# Patient Record
Sex: MALE | URBAN - METROPOLITAN AREA
[De-identification: names, ages, dates, MRNs, and addresses within clinical notes are randomized per-mention and may not be internally consistent; named-entity substitution may affect disease eponyms.]

---

## 2020-01-08 ENCOUNTER — EMERGENCY (EMERGENCY)
Facility: HOSPITAL | Age: 40
LOS: 0 days | Discharge: HOME | End: 2020-01-08
Attending: EMERGENCY MEDICINE
Payer: MEDICAID

## 2020-01-08 VITALS
SYSTOLIC BLOOD PRESSURE: 118 MMHG | TEMPERATURE: 98 F | OXYGEN SATURATION: 100 % | DIASTOLIC BLOOD PRESSURE: 79 MMHG | RESPIRATION RATE: 18 BRPM

## 2020-01-08 VITALS — WEIGHT: 199.96 LBS | HEIGHT: 68.9 IN
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
## 2020-01-08 DIAGNOSIS — S68.110A COMPLETE TRAUMATIC METACARPOPHALANGEAL AMPUTATION OF RIGHT INDEX FINGER, INITIAL ENCOUNTER: ICD-10-CM
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
## 2020-01-08 PROCEDURE — 99283 EMERGENCY DEPT VISIT LOW MDM: CPT | Mod: 25

## 2020-01-08 PROCEDURE — 73130 X-RAY EXAM OF HAND: CPT | Mod: RT

## 2020-01-08 PROCEDURE — 99283 EMERGENCY DEPT VISIT LOW MDM: CPT

## 2020-01-08 PROCEDURE — 73130 X-RAY EXAM OF HAND: CPT | Mod: 26,RT

## 2020-01-08 RX ORDER — OXYCODONE AND ACETAMINOPHEN 5; 325 MG/1; MG/1
2 TABLET ORAL ONCE
Refills: 0 | Status: DISCONTINUED | OUTPATIENT
Start: 2020-01-08 | End: 2020-01-08

## 2020-01-08 RX ADMIN — OXYCODONE AND ACETAMINOPHEN 2 TABLET(S): 5; 325 TABLET ORAL at 12:02

## 2020-01-08 NOTE — ED ADULT TRIAGE NOTE - CHIEF COMPLAINT QUOTE
pt reports amputation to the R. 1st finger at the 2nd joint yesterday s/p work injury, surgery done at [facility]. c/o increased pain to R. hand, took 1 perocet today and 2 yesterday w/o relief of pain. surgical dressing applied at this time.

## 2020-01-08 NOTE — ED ADULT NURSE NOTE - OBJECTIVE STATEMENT
Patient presents to the ED s/p amputation to the R 1st finger at the 2nd joint sustained yesterday while at work. Pt was then seen at [facility] where he had surgery. Pt now in ED c/o increasing pain to R hand. Pt took 2 Percocet yesterday and 1 Percocet today with no relief in symptoms.

## 2020-01-08 NOTE — ED STATDOCS - PATIENT PORTAL LINK FT
You can access the FollowMyHealth Patient Portal offered by [facility] by registering at the following website: [email or web address]. By joining [employer]’s FollowMyHealth portal, you will also be able to view your health information using other applications (apps) compatible with our system.

## 2020-01-08 NOTE — ED STATDOCS - NSFOLLOWUPINSTRUCTIONS_ED_ALL_ED_FT
Follow up with plastic surgery clinic at [facility] at your appointment 1/14/20 at 1pm    if new or worsening symptoms develop or your pain is uncontrolled with medication go to [facility] Emergency Room

## 2020-01-08 NOTE — ED STATDOCS - ATTENDING CONTRIBUTION TO CARE
I, Sarai Davis MD, personally saw the patient with ACP.  I have personally performed a face to face diagnostic evaluation on this patient.  I have reviewed the ACP note and agree with the history, exam, and plan of care, except as noted.

## 2020-01-08 NOTE — ED ADULT NURSE NOTE - CHIEF COMPLAINT QUOTE
pt reports amputation to the R. 1st finger at the 2nd joint yesterday s/p work injury, surgery done at [facility]. c/o increased pain to R. hand, took 1 perocet today and 2 yesterday w/o relief of pain. surgical dressing applied at this time.

## 2020-01-08 NOTE — ED STATDOCS - CLINICAL SUMMARY MEDICAL DECISION MAKING FREE TEXT BOX
39 M with R finger amputation was seen at outside ED yesterday p/w pain despite percocet. Will medicate. Consult ortho

## 2020-01-08 NOTE — ED STATDOCS - OBJECTIVE STATEMENT
38 y/o M with no relevant PMHx presents to the ED s/p amputation to the R 1st finger at the 2nd joint sustained yesterday while at work. Pt was then seen at [facility] where he had surgery. Pt now in ED c/o increasing pain to R hand. Pt took 2 Percocet yesterday and 1 Percocet today with no relief in symptoms.

## 2020-01-08 NOTE — ED STATDOCS - PROGRESS NOTE DETAILS
38 y/o M with no relevant PMHx presents to the ED s/p amputation to the R 1st finger at the PIP sustained yesterday while at work. Pt was then seen at [facility] where he saw plastic surgery resident. Pt now in ED c/o increasing pain to R hand. Pt took 2 Percocet yesterday and 1 Percocet today with no relief in symptoms.  PE: R arm casted and wrapped with ace, cap refill < 2 seconds, NVI, lungs CTA b/l, heart RRR s1/s2  Plan: XR, ortho  Colleen Jordan PA-C Spoke with ortho resident, said they would not remove surgical dressing, states pt should f/u with surgeon.  Dr. Davis called and spoke with [facility] ED, who advised pt was seen by plastics resident only and was given appt to f/u with plastics clinic on Tuesday (clinic only operates on Tuesday), pt does not have an attending to f/u with   Colleen Jordan PA-C Spoke with ortho resident, said they would not remove surgical dressing, states pt should f/u with surgeon.  Dr. Davis called and spoke with [facility] ED, who advised pt was seen by plastics resident only and was given appt to f/u with plastics clinic (run by [facility]) on Tuesday (clinic only operates on Tuesday), pt does not have an attending to f/u with   Colleen Jordan PA-C spoke with plastics on call Dr. Reyes who said pt should f/u with [facility], spoke with RN and  at [place] [facility] who state that pt cannot be seen in office because he had Medicaid, will need to f/u in clinic, can return to [facility] to see plastics residents if necessary. Explained to pt, pt is NVI, pain controlled with two percocet, will send pain control and d/c home, pt advised he must f/u with [facility] ED for new or worsening sx, or uncontrolled pain, pt verbalized understanding.   used spoke with plastics on call Dr. Reyes who said pt should f/u with [facility], spoke with RN and  at [place] [facility] who state that pt cannot be seen in office because he had Medicaid, will need to f/u in clinic, can return to [facility] to see plastics residents if necessary. Explained to pt, pt is NVI, pain controlled with two percocet, will send pain control and d/c home, pt advised he must f/u with [facility] ED for new or worsening sx, or uncontrolled pain, pt verbalized understanding.   used number [id number].  Pt agreeable to d/c and plan of care, all questions answered, strict return precautions given.  Case discussed with attending Dr. Davis who agrees with plan.   Colleen Jordan PA-C

## 2022-08-06 NOTE — ED STATDOCS - PMH
No pertinent past medical history <<----- Click to add NO pertinent Past Medical History Erythromycin Pregnancy And Lactation Text: This medication is Pregnancy Category B and is considered safe during pregnancy. It is also excreted in breast milk.

## 2022-08-21 ENCOUNTER — EMERGENCY (EMERGENCY)
Facility: HOSPITAL | Age: 42
LOS: 0 days | Discharge: HOME | End: 2022-08-21
Attending: STUDENT IN AN ORGANIZED HEALTH CARE EDUCATION/TRAINING PROGRAM
Payer: MEDICAID

## 2022-08-21 VITALS
OXYGEN SATURATION: 100 % | RESPIRATION RATE: 18 BRPM | HEIGHT: 72 IN | WEIGHT: 210.1 LBS | TEMPERATURE: 99 F | DIASTOLIC BLOOD PRESSURE: 84 MMHG | SYSTOLIC BLOOD PRESSURE: 141 MMHG | HEART RATE: 68 BPM

## 2022-08-21 DIAGNOSIS — J02.9 ACUTE PHARYNGITIS, UNSPECIFIED: ICD-10-CM

## 2022-08-21 DIAGNOSIS — R10.9 UNSPECIFIED ABDOMINAL PAIN: ICD-10-CM

## 2022-08-21 DIAGNOSIS — M54.50 LOW BACK PAIN, UNSPECIFIED: ICD-10-CM

## 2022-08-21 PROCEDURE — 99282 EMERGENCY DEPT VISIT SF MDM: CPT

## 2022-08-21 PROCEDURE — 99284 EMERGENCY DEPT VISIT MOD MDM: CPT

## 2022-08-21 NOTE — ED ADULT TRIAGE NOTE - CHIEF COMPLAINT QUOTE
Pt presents to the ED c/o throat pain and stomach pain since 3 days ago. pt endorses diarrhea, pt denies n/v, fevers, SOB, difficulty breathing. pt last took Advil this am. pt on amoxicillin for "throat infection", not prescribed it.

## 2022-08-21 NOTE — ED STATDOCS - NS_ ATTENDINGSCRIBEDETAILS _ED_A_ED_FT
I, Saul Dawson DO,  performed the initial face to face bedside interview with this patient regarding history of present illness, review of symptoms and relevant past medical, social and family history.  I completed an independent physical examination.  I was the initial provider who evaluated this patient.   I personally saw the patient and performed a substantive portion of the visit including all aspects of the medical decision making.  I have signed out the follow up of any pending tests (i.e. labs, radiological studies) to the CORINNE.  I have communicated the patient’s plan of care and disposition with the CORINNE.  The history, relevant review of systems, past medical and surgical history, medical decision making, and physical examination was documented by the scribe in my presence and I attest to the accuracy of the documentation.

## 2022-08-21 NOTE — ED STATDOCS - PHYSICAL EXAMINATION
Gen: NAD, AOx3, able to make needs known, non-toxic  Head: NCAT  HEENT: EOMI, oral mucosa moist, normal conjunctiva, tonsils are mildly erythematous and edematous. No exudates.  Lung: CTAB, no respiratory distress, no wheezes/rhonchi/rales B/L, speaking in full sentences  CV: RRR, no murmurs  Abd: non distended, soft, nontender, no guarding, no CVA tenderness  MSK: no visible deformities  Neuro: Appears non focal  Skin: Warm, well perfused, no rash  Psych: normal affect

## 2022-08-21 NOTE — ED STATDOCS - CLINICAL SUMMARY MEDICAL DECISION MAKING FREE TEXT BOX
40 y/o male with no pertinent PMHx presenting with sore throat, diarrhea, and abdominal pain. Pt is well appearing, NAD. Abd benign on exam, do not suspect acute intraabdominal process, will defer CT abdomen pelvis at this time. given constellation of sx, suspect viral illness. Do not suspect bacterial pharyngitis, recommended stop taking amoxicillin. Recommended home management with over the counter pain meds, cough suppression and staying hydrated. Will DC.

## 2022-08-21 NOTE — ED STATDOCS - NSFOLLOWUPINSTRUCTIONS_ED_ALL_ED_FT
PAM MOTRIN Y TYLENOL SEGÚN LO NECESITES. AUMENTAR LA INGESTA DE LÍQUIDOS. REGRESE AL DEPARTAMENTO DE EMERGENCIA SI LOS SÍNTOMAS EMPEORAN.    VIRAL SYNDROME - Ambulatory Care           Viral Syndrome    AMBULATORY CARE:    Viral syndrome is a term used for symptoms of an infection caused by a virus. Viruses are spread easily from person to person on shared items.    Signs and symptoms may start slowly or suddenly and last hours to days. They can be mild to severe and can change over days or hours. You may have any of the following:  •Fever and chills      •A runny or stuffy nose      •Cough, sore throat, or hoarseness      •Headache, or pain and pressure around your eyes      •Muscle aches and joint pain      •Shortness of breath or wheezing      •Abdominal pain, cramps, and diarrhea      •Nausea, vomiting, or loss of appetite      Call your local emergency number (911 in the [place]), or have someone call if:   •You have a seizure.      •You cannot be woken.      •You have chest pain or trouble breathing.      Seek care immediately if:   •You have a stiff neck, a bad headache, and sensitivity to light.      •You feel weak, dizzy, or confused.      •You stop urinating or urinate a lot less than usual.      •You cough up blood or thick yellow or green mucus.      •You have severe abdominal pain or your abdomen is larger than usual.      Call your doctor if:   •Your symptoms do not get better with treatment or get worse after 3 days.      •You have a rash or ear pain.      •You have burning when you urinate.      •You have questions or concerns about your condition or care.      Treatment for viral syndrome may include medicines to manage your symptoms. Antibiotics are not given for a viral infection. You may need any of the following:   •Acetaminophen decreases pain and fever. It is available without a doctor's order. Ask how much to take and how often to take it. Follow directions. Read the labels of all other medicines you are using to see if they also contain acetaminophen, or ask your doctor or pharmacist. Acetaminophen can cause liver damage if not taken correctly.      •NSAIDs, such as ibuprofen, help decrease swelling, pain, and fever. NSAIDs can cause stomach bleeding or kidney problems in certain people. If you take blood thinner medicine, always ask your healthcare provider if NSAIDs are safe for you. Always read the medicine label and follow directions.      •Cold medicine helps decrease swelling, control a cough, and relieve chest or nasal congestion.      •Saline nasal spray helps decrease nasal congestion.      Manage your symptoms:   •Drink liquids as directed to prevent dehydration. Ask how much liquid to drink each day and which liquids are best for you. Do not drink liquids with caffeine. Caffeine can make dehydration worse.       •Get plenty of rest to help your body heal. Take naps throughout the day. Ask your healthcare provider when you can return to work and your normal activities.      •Use a cool mist humidifier to increase air moisture in your home. This may make it easier for you to breathe and help decrease your cough.       •Drink tea with honey or use cough drops for a sore throat. Cough drops are available without a doctor's order. Follow directions for taking cough drops.      •Do not smoke or be close to anyone who is smoking. Nicotine and other chemicals in cigarettes and cigars can cause lung damage. Smoking can also delay healing. Ask your healthcare provider for information if you currently smoke and need help to quit. E-cigarettes or smokeless tobacco still contain nicotine. Talk to your healthcare provider before you use these products.      Prevent the spread of germs:   •Wash your hands often throughout the day. Use soap and water. Rub your soapy hands together, lacing your fingers, for at least 20 seconds. Rinse with warm, running water. Dry your hands with a clean towel or paper towel. Use hand  that contains alcohol if soap and water are not available. Teach children how to wash their hands and use hand .  Handwashing           •Cover sneezes and coughs. Turn your face away and cover your mouth and nose with a tissue. Throw the tissue away. Use the bend of your arm if a tissue is not available. Then wash your hands well with soap and water or use hand . Teach children how to cover a cough or sneeze.      •Stay home while you are sick. Avoid crowds as much as possible.      •Get the influenza (flu) vaccine as soon as recommended each year. The flu vaccine is available starting in September or October. Ask your healthcare provider about the pneumonia vaccine. This vaccine is usually recommended every 5 years in older adults.              Follow up with your doctor as directed: Write down your questions so you remember to ask them during your visits.

## 2022-08-21 NOTE — ED STATDOCS - OBJECTIVE STATEMENT
: [id number]. 42 y/o male with no pertinent PMHx presents to the ED c/o sore throat, diarrhea, abdominal pain since 3 days ago. Still endorsing those sx. Denies vomiting, blood in diarrhea, coughing, sick contact. Also endorses SOB at night, subjective fever, lower back pain. Pt thought he had a throat infection and bought amoxicillin. Pt started taking it a few days ago. Denies daily medication. Non smoker. NKDA.

## 2022-08-21 NOTE — ED STATDOCS - PATIENT PORTAL LINK FT
You can access the FollowMyHealth Patient Portal offered by [facility] by registering at the following website: [email or web address]. By joining "[employer]’s FollowMyHealth portal, you will also be able to view your health information using other applications (apps) compatible with our system.

## 2022-08-21 NOTE — ED STATDOCS - ATTENDING APP SHARED VISIT CONTRIBUTION OF CARE
Attending Samir: I performed a history and physical exam of the patient and discussed their management with the CORINNE. I have reviewed the CORINNE note and agree with the documented findings and plan of care, except as noted. This was a shared visit with an CORINNE. I reviewed and verified the documentation and independently performed my own history/exam/medical decision making. My medical decision making and observations are found above. Please refer to any progress notes for updates on clinical course.

## 2022-08-22 PROBLEM — Z78.9 OTHER SPECIFIED HEALTH STATUS: Chronic | Status: ACTIVE | Noted: 2020-01-09

## 2022-09-20 PROBLEM — Z00.00 ENCOUNTER FOR PREVENTIVE HEALTH EXAMINATION: Status: ACTIVE | Noted: 2022-09-20

## 2022-09-24 ENCOUNTER — EMERGENCY (EMERGENCY)
Facility: HOSPITAL | Age: 42
LOS: 0 days | Discharge: HOME | End: 2022-09-24
Attending: EMERGENCY MEDICINE
Payer: MEDICAID

## 2022-09-24 VITALS
RESPIRATION RATE: 19 BRPM | TEMPERATURE: 98 F | HEART RATE: 81 BPM | OXYGEN SATURATION: 100 % | SYSTOLIC BLOOD PRESSURE: 123 MMHG | DIASTOLIC BLOOD PRESSURE: 79 MMHG

## 2022-09-24 VITALS — HEIGHT: 72 IN

## 2022-09-24 DIAGNOSIS — M54.31 SCIATICA, RIGHT SIDE: ICD-10-CM

## 2022-09-24 DIAGNOSIS — M79.604 PAIN IN RIGHT LEG: ICD-10-CM

## 2022-09-24 PROCEDURE — 99283 EMERGENCY DEPT VISIT LOW MDM: CPT | Mod: 25

## 2022-09-24 PROCEDURE — 96372 THER/PROPH/DIAG INJ SC/IM: CPT

## 2022-09-24 PROCEDURE — 99284 EMERGENCY DEPT VISIT MOD MDM: CPT

## 2022-09-24 RX ORDER — KETOROLAC TROMETHAMINE 30 MG/ML
60 SYRINGE (ML) INJECTION ONCE
Refills: 0 | Status: DISCONTINUED | OUTPATIENT
Start: 2022-09-24 | End: 2022-09-24

## 2022-09-24 RX ORDER — DIAZEPAM 5 MG
1 TABLET ORAL
Qty: 12 | Refills: 0
Start: 2022-09-24 | End: 2022-09-26

## 2022-09-24 RX ORDER — IBUPROFEN 200 MG
1 TABLET ORAL
Qty: 40 | Refills: 0
Start: 2022-09-24 | End: 2022-10-03

## 2022-09-24 RX ADMIN — Medication 60 MILLIGRAM(S): at 22:46

## 2022-09-24 NOTE — ED STATDOCS - OBJECTIVE STATEMENT
42 yo male pw right buttocks pain radiating down his leg to his heel. no fall, no heavy lifting, he states it has been getting worse for the last 3 days

## 2022-09-24 NOTE — ED STATDOCS - PHYSICAL EXAMINATION
Gen:  Well appearning in NAD  Head:  NC/AT  Resp: No distress   Ext: no deformities, right buttocks pain radiating to knee, strength +5/+5  Skin: warm and dry as visualized

## 2022-09-24 NOTE — ED STATDOCS - NSFOLLOWUPINSTRUCTIONS_ED_ALL_ED_FT
Ciática    Sciatica       La ciática es el dolor, debilidad, hormigueo o pérdida de la sensibilidad (adormecimiento) a lo yamini del nervio ciático. El nervio ciático comienza en la parte inferior de la espalda y desciende por la parte posterior de cada pierna. Suele desaparecer por sí olga o con tratamiento. A veces, la ciática puede volver a aparecer (ser recurrente).      ¿Cuáles son las causas?    Esta afección se produce cuando el nervio ciático se comprime o se ejerce presión sobre él. [place] puede ser el resultado de:  •Un disco que sobresale demasiado entre los huesos de la columna vertebral (hernia de disco).      •Los cambios que se producen en los discos vertebrales al envejecer.      •Bert afección en un músculo de las nalgas.      •Un crecimiento óseo adicional cerca del nervio ciático.      •Bert rotura (fractura) de la christina que está entre los huesos de la cadera (pelvis).      •Embarazo.       •Tumor. [place] es poco frecuente.        ¿Qué incrementa el riesgo?    Es más probable que tengan esta afección las personas que:  •Practican deportes que ponen presión o tensión sobre la columna vertebral.      •Tienen poca fuerza y facilidad de movimiento (flexibilidad).      •Zapata tenido bert lesión en la espalda en el pasado.      •Zapata tenido bert cirugía en la espalda.      •Permanecen sentadas raghav largos períodos.      •Realizan actividades que implican agacharse o levantar objetos bert y otra vez.      •Tienen mucho sobrepeso (es isael).        ¿Cuáles son los signos o los síntomas?    Los síntomas pueden variar de leves a muy graves. Pueden incluir los siguientes:•Cualquiera de los siguientes problemas en la parte inferior de la espalda, piernas, cadera o nalgas:  •Hormigueo leve, pérdida de la sensibilidad o dolor sordo.      •Sensación de ardor.      •Dolor jaye.         •Pérdida de la sensibilidad en la parte posterior de la pantorrilla o la planta del pie.      •Debilidad en las piernas.       •Dolor muy intenso en la espalda que dificulta el movimiento.      Estos síntomas pueden empeorar al toser, estornudar o reír. También pueden empeorar al sentarse o estar de pie raghav largos períodos.      ¿Cómo se trata?    A menudo, esta afección mejora sin tratamiento. Sin embargo, el tratamiento puede incluir:  •Cambiar de actividad física o reducirla cuando siente dolor.      •Hacer ejercicios y estiramientos.      •Aplicar hielo o calor sobre la christina afectada.    •Medicamentos para lo siguiente:   •Aliviar el dolor y la inflamación.       •Relajar los músculos.         •Inyecciones de medicamentos que ayudan a aliviar el dolor, la irritación y la hinchazón.      •Cirugía.        Siga estas instrucciones en farley casa:    Medicamentos     •Armada los medicamentos de venta manav y los recetados solamente vinnie se lo haya indicado el médico.    •Consulte a farley médico si el medicamento que le recetaron:  •Hace que sea necesario que evite conducir o usar maquinaria pesada.    •Puede causarle dificultad para defecar (estreñimiento). Es posible que deba tariq estas medidas para prevenir o tratar los problemas para defecar:  •Beber suficiente líquido para mantener el pis (la orina) de color amarillo pálido.      •Tariq medicamentos recetados o de venta manav.      •Tacna alimentos ricos en fibra. Entre ellos, frijoles, cereales integrales y frutas y verduras frescas.      •Limitar los alimentos con alto contenido de grasa y azúcar. Estos incluyen alimentos fritos o dulces.            Control del dolor                 •Si se lo indican, aplique hielo en la christina afectada.  •Ponga el hielo en bert bolsa plástica.      •Coloque bert toalla entre la piel y la bolsa.      •Coloque el hielo raghav 20 minutos, 2 a 3 veces por día.      •Si se lo indican, aplique calor en la christina afectada. Use la zayra de calor que el médico le indique, por ejemplo, bert compresa de calor húmedo o bert almohadilla térmica.  •Coloque bert toalla entre la piel y la zayra de calor.      •Aplique calor raghav 20 a 30 minutos.      •Retire la zayra de calor si la piel se pone de color winkler brillante. [place] es muy importante si no puede sentir dolor, calor o frío. Puede correr un riesgo mayor de sufrir quemaduras.          Actividad      •Retome khushi actividades habituales vinnie se lo haya indicado el médico. Pregúntele al médico qué actividades son seguras para usted.      •Evite las actividades que empeoran los síntomas.    •Descanse por breves períodos raghav el día.  •Cuando descanse raghav períodos más largos, shana alguna actividad física o un estiramiento entre los períodos de descanso.      •Evite estar sentado raghav largos períodos sin moverse. Levántese y muévase al menos bert vez cada hora.        •Shana ejercicios y estírese con regularidad, vinnie se lo indicó el médico.    • No levante nada que pese más de 10 libras (4.5 kg) mientras tenga síntomas de ciática.  •Aunque no tenga síntomas, evite levantar objetos pesados.      •Evite levantar objetos pesados de forma repetida.      •Al levantar objetos, hágalo siempre de bert forma que sea day para farley cuerpo. Para esto, debe hacer lo siguiente:  •Flexione las rodillas.      •Mantenga el objeto cerca del cuerpo.      •No gire el cuerpo.        Instrucciones generales     •Mantenga un peso saludable.      •Use calzado cómodo, que le dé soporte al pie. Evite usar tacones.      •Evite dormir sobre un colchón que sea demasiado blando o demasiado zan. Es posible que sienta menos dolor si duerme en un colchón con apoyo suficientemente firme para la espalda.      •Concurra a todas las visitas de seguimiento vinnie se lo haya indicado el médico. [place] es importante.        Comuníquese con un médico si:  •Tiene un dolor con estas características:  •Lo despierta cuando está dormido.      •Empeora al estar recostado.      •Es peor que el dolor que tenía en el pasado.      •Dura más de 4 semanas.        •Pierde peso sin proponérselo.        Solicite ayuda inmediatamente si:    •No puede controlar la orina (micción) ni la evacuación de la materia fecal (defecación).    •Tiene debilidad en alguna de estas zonas, y la debilidad empeora:  •La parte inferior de la espalda.      •La christina que se encuentra entre los huesos de las caderas.      •Las nalgas.      •Las piernas.        •Siente irritación o inflamación en la espalda.      •Tiene sensación de ardor al orinar.        Resumen    •La ciática es el dolor, debilidad, hormigueo o pérdida de la sensibilidad (adormecimiento) a lo yamini del nervio ciático.      •Esta afección se produce cuando el nervio ciático se comprime o se ejerce presión sobre él.      •La ciática puede causar dolor, hormigueo o pérdida de la sensibilidad (adormecimiento) en la parte inferior de la espalda, las piernas, las caderas y las nalgas.      •El tratamiento a menudo incluye reposo, ejercicio, medicamentos y aplicar hielo o calor en la christina afectada.      Esta información no tiene vinnie fin reemplazar el consejo del médico. Asegúrese de hacerle al médico cualquier pregunta que tenga.      Document Revised: 02/12/2020 Document Reviewed: 02/12/2020    Elsevier Patient Education © 2022 [employer] Inc[employer]

## 2022-09-24 NOTE — ED STATDOCS - CLINICAL SUMMARY MEDICAL DECISION MAKING FREE TEXT BOX
pt with right leg pain from buttocks, no trauma, will give toradol pt is driving cannot give muscle relaxer

## 2022-09-24 NOTE — ED STATDOCS - CARE PROVIDER_API CALL
Oksana Finley  FAMILY MEDICINE  [address] [place] [address]  Phone: [phone]  Fax: [phone]  Follow Up Time:

## 2022-09-24 NOTE — ED STATDOCS - PATIENT PORTAL LINK FT
You can access the FollowMyHealth Patient Portal offered by [facility] by registering at the following website: [email or web address]. By joining [employer]’s FollowMyHealth portal, you will also be able to view your health information using other applications (apps) compatible with our system.

## 2022-09-29 ENCOUNTER — APPOINTMENT (OUTPATIENT)
Dept: OTOLARYNGOLOGY | Facility: CLINIC | Age: 42
End: 2022-09-29

## 2022-09-29 VITALS
WEIGHT: 200 LBS | TEMPERATURE: 97.2 F | BODY MASS INDEX: 33.32 KG/M2 | HEIGHT: 65 IN | HEART RATE: 62 BPM | DIASTOLIC BLOOD PRESSURE: 62 MMHG | SYSTOLIC BLOOD PRESSURE: 110 MMHG

## 2022-09-29 DIAGNOSIS — J32.2 CHRONIC ETHMOIDAL SINUSITIS: ICD-10-CM

## 2022-09-29 PROCEDURE — 31231 NASAL ENDOSCOPY DX: CPT

## 2022-09-29 PROCEDURE — 99203 OFFICE O/P NEW LOW 30 MIN: CPT | Mod: 25

## 2022-09-29 RX ORDER — AMOXICILLIN AND CLAVULANATE POTASSIUM 875; 125 MG/1; MG/1
875-125 TABLET, COATED ORAL TWICE DAILY
Qty: 20 | Refills: 2 | Status: COMPLETED | COMMUNITY
Start: 2022-09-29 | End: 2022-10-29

## 2022-09-29 RX ORDER — DIAZEPAM 5 MG/1
TABLET ORAL
Refills: 0 | Status: ACTIVE | COMMUNITY

## 2022-09-29 RX ORDER — PREDNISONE 10 MG/1
10 TABLET ORAL TWICE DAILY
Qty: 20 | Refills: 2 | Status: ACTIVE | COMMUNITY
Start: 2022-09-29 | End: 1900-01-01

## 2022-09-29 RX ORDER — IBUPROFEN 600 MG/1
600 TABLET, FILM COATED ORAL
Refills: 0 | Status: ACTIVE | COMMUNITY

## 2022-09-29 RX ORDER — AZELASTINE HYDROCHLORIDE 137 UG/1
0.1 SPRAY, METERED NASAL TWICE DAILY
Qty: 1 | Refills: 5 | Status: ACTIVE | COMMUNITY
Start: 2022-09-29 | End: 1900-01-01

## 2022-09-29 NOTE — HISTORY OF PRESENT ILLNESS
[de-identified] : co excessive mucous throat\par nasal obstruction past year\par no allergies\par co frequent sinusitis\par neg hx trauma\par used fluticasone-not helping\par

## 2022-09-29 NOTE — ASSESSMENT
[FreeTextEntry1] : moderate septal deviation hypertrophic turbinates\par ?chronic sinusitis\par amox clav\par pred 10 #20\par azelastine spray\par fu 4 weeks\par consider office rf reduction turbinates\par Time with patient over 30 minutes for exam and counseling.\par

## 2022-09-29 NOTE — PROCEDURE
[FreeTextEntry6] : Indication for procedure:  Unable to adequately examine mid and posterior nasal cavity with anterior rhinoscopy\par The patient has chronic nasal congestion\par \par Sinus endoscope # 44\par Nasal septum exam shows septal deviation to the rt at valve 2 plus left posterior dev 2 plus\par The inferior nasal turbinates are moderately hypertrophic in size bilaterally.\par The sinus endoscope was introduced into the right nares\par exam right middle meatus reveals no mucopus or inflammation.  The right middle turbinate is WNL.\par The scope was advanced and the sphenoethmoid region was inspected.\par The superior meatus, superior turbinate and nasal vault are unremarkable.  The nasopharynx is unremarkable without inflammation or mass.\par The sinus endoscope was introduced into the left nares and\par exam left middle meatus reveals no mucopus or inflammation.  The left middle turbinate is WNL.\par The scope was advanced and the sphenoethmoid region was inspected.\par The left superior meatus and nasal vault are unremarkable.\par \par \par

## 2022-09-29 NOTE — HISTORY OF PRESENT ILLNESS
[de-identified] : co excessive mucous throat\par nasal obstruction past year\par no allergies\par co frequent sinusitis\par neg hx trauma\par used fluticasone-not helping\par

## 2022-10-09 ENCOUNTER — EMERGENCY (EMERGENCY)
Facility: HOSPITAL | Age: 42
LOS: 0 days | Discharge: HOME | End: 2022-10-09
Attending: STUDENT IN AN ORGANIZED HEALTH CARE EDUCATION/TRAINING PROGRAM
Payer: MEDICAID

## 2022-10-09 VITALS — WEIGHT: 205.03 LBS | HEIGHT: 72 IN

## 2022-10-09 VITALS
OXYGEN SATURATION: 97 % | RESPIRATION RATE: 18 BRPM | TEMPERATURE: 99 F | SYSTOLIC BLOOD PRESSURE: 123 MMHG | DIASTOLIC BLOOD PRESSURE: 71 MMHG | HEART RATE: 89 BPM

## 2022-10-09 DIAGNOSIS — H92.12 OTORRHEA, LEFT EAR: ICD-10-CM

## 2022-10-09 DIAGNOSIS — H92.01 OTALGIA, RIGHT EAR: ICD-10-CM

## 2022-10-09 DIAGNOSIS — H66.91 OTITIS MEDIA, UNSPECIFIED, RIGHT EAR: ICD-10-CM

## 2022-10-09 PROCEDURE — 99283 EMERGENCY DEPT VISIT LOW MDM: CPT

## 2022-10-09 RX ORDER — OFLOXACIN 200 MG
10 TABLET ORAL
Qty: 1 | Refills: 0
Start: 2022-10-09 | End: 2022-10-22

## 2022-10-09 NOTE — ED STATDOCS - OBJECTIVE STATEMENT
40 y/o male with no pertinent PMhx presents to the ED c/o ear pain.  528341 used. States he has been having R ear pain x5 days. Notes he is unable to hear from ear and also has fluid from ear. Also reports being on Amoxicillin x10 days  for sore throat and nasal discharge. Also on Ibuprofen, Prednisone and zycin spray.  No cough, HA, CP, n/v.  denies PMhx or regular medication use. PCP: Dr Finley. NKDA. No surgical hx. Denies ear drops use.

## 2022-10-09 NOTE — ED STATDOCS - ATTENDING APP SHARED VISIT CONTRIBUTION OF CARE
I, Wang Zhang DO, personally saw the patient with ACP.  I performed a substantiative portion of the visit. I personally performed a face to face diagnostic evaluation on this patient and formulated the patient plan. Free text HPI, ROS, PE, MDM documented above by myself or with the aid of a scribe and represents my findings. The case was discussed with, and handed off to ACP who followed the case through to the re-evaluation and disposition.

## 2022-10-09 NOTE — ED STATDOCS - PHYSICAL EXAMINATION
Vital signs as available reviewed.  General:  No acute distress.  Head:  Normocephalic, atraumatic.  Eyes:  Conjunctiva pink, no icterus.  ENT: +L ear with moderate wax, tympanic membrane normal, R ear no mastoid tenderness, +debris in canal, TM not visualized   Cardiovascular:  Regular rate, no obvious murmur.  Respiratory:  Clear to auscultation, good air entry bilaterally.  Abdomen:  Soft, non-tender.  Musculoskeletal:  No obvious deformity.  Neurologic: Alert and oriented, moving all extremities.  Skin:  Warm and dry.

## 2022-10-09 NOTE — ED ADULT TRIAGE NOTE - CHIEF COMPLAINT QUOTE
PT C/O RIGHT EARACHE X 5 DAYS, PT CURRENTLY BEING TREATED FOR SORE THROAT, ON AMOXICILLIN X 7 DAYS.  DENIES FEVER, CHILLS.

## 2022-10-09 NOTE — ED STATDOCS - PATIENT PORTAL LINK FT
You can access the FollowMyHealth Patient Portal offered by [facility] by registering at the following website: [email or web address]. By joining [email or web address]’s FollowMyHealth portal, you will also be able to view your health information using other applications (apps) compatible with our system.

## 2022-10-09 NOTE — ED STATDOCS - CLINICAL SUMMARY MEDICAL DECISION MAKING FREE TEXT BOX
Pt with otitis externa, likely on top of otitis media, some debris removed from canal however, removal stopped for concern of causing damage to ear structure, will give abx drops , switch abx to Augmentin and ENT f/o. Pt with otitis externa, likely on top of otitis media, some debris removed from canal however, removal stopped for concern of causing damage to ear structures, will give abx drops , switch abx to Augmentin and ENT f/u.

## 2022-10-09 NOTE — ED STATDOCS - NS ED ROS FT
Constitutional: No reported recent fever.  Neurological: No reported acute headache.  Eyes: No reported new vision changes.   Ears, Nose, Mouth, Throat: No reported acute sore throat. +R ear pain +difficulty hearing +fluid from R ear  Cardiovascular: No reported current chest pain.  Respiratory: No reported new shortness of breath.  Gastrointestinal: No reported vomiting.  Genitourinary: No reported new urinary problems.  Musculoskeletal: No reported acute extremity pain.  Integumentary (skin and/or breast): No reported new rash.

## 2022-10-09 NOTE — ED STATDOCS - PROGRESS NOTE DETAILS
patient seen and evaluated with ED attending at intake.  Reporting ear pain and discahrge.  will switch to augmentin and add ofloxacin.  he has ENT f/u -Naa Sims PA-C

## 2022-10-09 NOTE — ED STATDOCS - NSFOLLOWUPINSTRUCTIONS_ED_ALL_ED_FT
Secreción del oído    Ear Drainage      Secreción del oído significa que se eliminan cerumen, pus, katherine u otros líquidos a través del oído.      Siga estas instrucciones en farley casa:    Esté atento a los cambios en la secreción del oído. Infórmele a farley médico acerca de los cambios. Isaac ayuda para aliviar los síntomas, tome estas medidas.      Protéjase el oído   A sign showing not to use a cotton swab in your ear.   • No use hisopos con punta de algodón en el oído. No se coloque ningún otro objeto en el oído.      • No practique natación hasta que el médico lo autorice.      •Antes de ducharse, unte bert bolita de algodón con vaselina y colóquesela en el oído. De erma modo, arnoldo que le entre agua en el oído.      •Lávese las wade con agua y jabón raghav 20 segundos antes y después de tocarse los oídos.      Indicaciones generales     •Use los medicamentos de venta manav y los recetados solamente isaac se lo haya indicado el médico. Termine todo el antibiótico, aunque comience a sentirse mejor.      •Evite estar cerca de humo de tabaco.      •Concurra a todas las visitas de seguimiento.        Comuníquese con un médico si:    •Aumenta la secreción.      •Tiene dolor de oído.      •Tiene fiebre.      •La secreción no mejora con tratamiento.      •La secreción del oído es sanguinolenta, fátima, transparente o amarilla.      •El oído está enrojecido o inflamado.        Solicite ayuda de inmediato si:    •Tiene dolor de oído muy intenso.      •Tiene un dolor de elin muy intenso.      •Vomita.      •Siente mareos.      •Tiene bert convulsión.      •Tiene síntomas nuevos de pérdida de la audición.      Estos síntomas pueden indicar bert emergencia. Solicite ayuda de inmediato. Comuníquese con el servicio de emergencias de farley localidad (911 en los [place]).   • No espere a jamal si los síntomas desaparecen.       • No conduzca por khushi propios medios hasta el hospital.         Resumen    •Secreción del oído significa que se eliminan cerumen, pus, katherine u otros líquidos a través del oído.      •Controle los cambios en khushi síntomas. Dígale a farley médico sobre ellos. Siga las indicaciones de farley médico.      •Hable con farley médico si tiene más supuración, secreción con katherine, dolor de oído, fiebre o hinchazón.      •Solicite ayuda de inmediato si está vomitando, tiene dolor de oído muy intenso, tiene un dolor de elin muy intenso, se siente mareado, sufre bert convulsión, o tiene bert nueva pérdida de la audición.      Esta información no tiene isaac fin reemplazar el consejo del médico. Asegúrese de hacerle al médico cualquier pregunta que tenga.

## 2022-10-12 ENCOUNTER — APPOINTMENT (OUTPATIENT)
Dept: OTOLARYNGOLOGY | Facility: CLINIC | Age: 42
End: 2022-10-12

## 2022-10-12 VITALS — WEIGHT: 200 LBS | HEIGHT: 63 IN | TEMPERATURE: 97.7 F | BODY MASS INDEX: 35.44 KG/M2

## 2022-10-12 DIAGNOSIS — H61.21 IMPACTED CERUMEN, RIGHT EAR: ICD-10-CM

## 2022-10-12 PROCEDURE — 99213 OFFICE O/P EST LOW 20 MIN: CPT | Mod: 25

## 2022-10-12 PROCEDURE — 31231 NASAL ENDOSCOPY DX: CPT

## 2022-10-12 PROCEDURE — 69210 REMOVE IMPACTED EAR WAX UNI: CPT

## 2022-10-12 NOTE — REASON FOR VISIT
[Subsequent Evaluation] : a subsequent evaluation for [Ear Pain] : ear pain [FreeTextEntry2] : fu re nose

## 2022-10-12 NOTE — ASSESSMENT
[FreeTextEntry1] : cerumen cleared rt otitis externa\par brien powder\par ofloxn gtts amox clav as ordered urgent care\par chronic rhinits-improved\par continue azelastine spray\par fu 2 weeks\par  22 minutes spent with patient with exam and counseling excluding time for any procedure.\par

## 2022-10-12 NOTE — PROCEDURE
[Cerumen Impaction] : Cerumen Impaction [FreeTextEntry6] : Large amount cerumen cleared right ear instrumentation with curettes, forceps and suction.\par Ear canals and tympanic membranes are unremarkable.\par pus cleared ad canal red

## 2022-10-12 NOTE — HISTORY OF PRESENT ILLNESS
Shorty Mejia is a 35 year old male presenting with complaints of body aches, chills, shaking, sore throat, low grade fever, and night sweats. Pt was here on 11/5 and treated with amox for presumptive strep. Throat felt a little better but have all the other symptoms. Sore throat is now back and was seen 11/20 with neg rapid and culture. Pt reports he did at home swab on 11/22 that was positive.   Symptoms started 11/5.   OTC medications used: Excedrin   Denies  known Latex allergy or symptoms of Latex sensitivity.  History   Smoking Status   • Former Smoker   • Packs/day: 0.10   • Types: Cigarettes   Smokeless Tobacco   • Never Used     All allergies and medications reviewed.  PCP verified:  Mirna GILES   Pharmacy verified:  [facility]     
[de-identified] : f/u\par Co excessive mucous throat\par nasal obstruction past year\par completed pred azelastine and amox clav\par improved\par co rt ear pain and decreased hearing\par to urgent care started ofloxin gtts and amox clav

## 2022-10-27 ENCOUNTER — APPOINTMENT (OUTPATIENT)
Dept: OTOLARYNGOLOGY | Facility: CLINIC | Age: 42
End: 2022-10-27

## 2022-10-27 VITALS — HEIGHT: 63 IN | TEMPERATURE: 96.6 F | BODY MASS INDEX: 35.44 KG/M2 | WEIGHT: 200 LBS

## 2022-10-27 DIAGNOSIS — J31.0 CHRONIC RHINITIS: ICD-10-CM

## 2022-10-27 DIAGNOSIS — J34.2 DEVIATED NASAL SEPTUM: ICD-10-CM

## 2022-10-27 DIAGNOSIS — H60.311 DIFFUSE OTITIS EXTERNA, RIGHT EAR: ICD-10-CM

## 2022-10-27 DIAGNOSIS — J34.3 HYPERTROPHY OF NASAL TURBINATES: ICD-10-CM

## 2022-10-27 PROCEDURE — 99213 OFFICE O/P EST LOW 20 MIN: CPT

## 2022-10-27 NOTE — ASSESSMENT
[FreeTextEntry1] : small amount septal deviation\par turbinate hypertrophy\par resolved otitis externa\par reviewed options rf turbinates office but could need second procedure for septoplasty\par small change bleeding requiring cautery or packing\par expect 50% reduction nasal/postnasal drip and relief of nasal congestion.\par \par will continue w azelastine for now\par fu prn\par  20 minutes spent with patient with exam and counseling excluding time for any procedure.\par

## 2022-10-27 NOTE — REVIEW OF SYSTEMS
[Throat Pain] : throat pain [Throat Dryness] : throat dryness [Negative] : Heme/Lymph [Patient Intake Form Reviewed] : Patient intake form was reviewed

## 2022-10-27 NOTE — PHYSICAL EXAM
[de-identified] : ad canal noerythema tm intact [] : septum deviated to the left [Midline] : trachea located in midline position [Normal] : no rashes

## 2022-10-27 NOTE — HISTORY OF PRESENT ILLNESS
[de-identified] : co left nasal congestion\par using azelatine spray w benefit\par fu rt otitis externa-no more pain

## 2024-02-02 ENCOUNTER — EMERGENCY (EMERGENCY)
Facility: HOSPITAL | Age: 44
LOS: 0 days | Discharge: HOME | End: 2024-02-02
Attending: STUDENT IN AN ORGANIZED HEALTH CARE EDUCATION/TRAINING PROGRAM
Payer: MEDICAID

## 2024-02-02 VITALS — WEIGHT: 220.02 LBS

## 2024-02-02 VITALS
TEMPERATURE: 99 F | DIASTOLIC BLOOD PRESSURE: 64 MMHG | HEART RATE: 76 BPM | RESPIRATION RATE: 18 BRPM | OXYGEN SATURATION: 98 % | SYSTOLIC BLOOD PRESSURE: 104 MMHG

## 2024-02-02 DIAGNOSIS — R07.89 OTHER CHEST PAIN: ICD-10-CM

## 2024-02-02 DIAGNOSIS — R05.9 COUGH, UNSPECIFIED: ICD-10-CM

## 2024-02-02 DIAGNOSIS — R50.9 FEVER, UNSPECIFIED: ICD-10-CM

## 2024-02-02 DIAGNOSIS — Z20.822 CONTACT WITH AND (SUSPECTED) EXPOSURE TO COVID-19: ICD-10-CM

## 2024-02-02 DIAGNOSIS — R79.89 OTHER SPECIFIED ABNORMAL FINDINGS OF BLOOD CHEMISTRY: ICD-10-CM

## 2024-02-02 LAB
ALBUMIN SERPL ELPH-MCNC: 3.8 G/DL — SIGNIFICANT CHANGE UP (ref 3.3–5)
ALP SERPL-CCNC: 109 U/L — SIGNIFICANT CHANGE UP (ref 40–120)
ALT FLD-CCNC: 80 U/L — HIGH (ref 12–78)
ANION GAP SERPL CALC-SCNC: 6 MMOL/L — SIGNIFICANT CHANGE UP (ref 5–17)
APTT BLD: 34.1 SEC — SIGNIFICANT CHANGE UP (ref 24.5–35.6)
AST SERPL-CCNC: 42 U/L — HIGH (ref 15–37)
BASOPHILS # BLD AUTO: 0.05 K/UL — SIGNIFICANT CHANGE UP (ref 0–0.2)
BASOPHILS NFR BLD AUTO: 0.6 % — SIGNIFICANT CHANGE UP (ref 0–2)
BILIRUB SERPL-MCNC: 0.5 MG/DL — SIGNIFICANT CHANGE UP (ref 0.2–1.2)
BUN SERPL-MCNC: 14 MG/DL — SIGNIFICANT CHANGE UP (ref 7–23)
CALCIUM SERPL-MCNC: 8.2 MG/DL — LOW (ref 8.5–10.1)
CHLORIDE SERPL-SCNC: 105 MMOL/L — SIGNIFICANT CHANGE UP (ref 96–108)
CO2 SERPL-SCNC: 25 MMOL/L — SIGNIFICANT CHANGE UP (ref 22–31)
CREAT SERPL-MCNC: 1.01 MG/DL — SIGNIFICANT CHANGE UP (ref 0.5–1.3)
D DIMER BLD IA.RAPID-MCNC: 164 NG/ML DDU — SIGNIFICANT CHANGE UP
EGFR: 94 ML/MIN/1.73M2 — SIGNIFICANT CHANGE UP
EOSINOPHIL # BLD AUTO: 0.99 K/UL — HIGH (ref 0–0.5)
EOSINOPHIL NFR BLD AUTO: 11.9 % — HIGH (ref 0–6)
FLUAV AG NPH QL: SIGNIFICANT CHANGE UP
FLUBV AG NPH QL: SIGNIFICANT CHANGE UP
GLUCOSE SERPL-MCNC: 127 MG/DL — HIGH (ref 70–99)
HCT VFR BLD CALC: 45.6 % — SIGNIFICANT CHANGE UP (ref 39–50)
HGB BLD-MCNC: 16.6 G/DL — SIGNIFICANT CHANGE UP (ref 13–17)
IMM GRANULOCYTES NFR BLD AUTO: 0.4 % — SIGNIFICANT CHANGE UP (ref 0–0.9)
INR BLD: 1 RATIO — SIGNIFICANT CHANGE UP (ref 0.85–1.18)
LYMPHOCYTES # BLD AUTO: 3.13 K/UL — SIGNIFICANT CHANGE UP (ref 1–3.3)
LYMPHOCYTES # BLD AUTO: 37.6 % — SIGNIFICANT CHANGE UP (ref 13–44)
MCHC RBC-ENTMCNC: 30.6 PG — SIGNIFICANT CHANGE UP (ref 27–34)
MCHC RBC-ENTMCNC: 36.4 GM/DL — HIGH (ref 32–36)
MCV RBC AUTO: 84 FL — SIGNIFICANT CHANGE UP (ref 80–100)
MONOCYTES # BLD AUTO: 0.63 K/UL — SIGNIFICANT CHANGE UP (ref 0–0.9)
MONOCYTES NFR BLD AUTO: 7.6 % — SIGNIFICANT CHANGE UP (ref 2–14)
NEUTROPHILS # BLD AUTO: 3.49 K/UL — SIGNIFICANT CHANGE UP (ref 1.8–7.4)
NEUTROPHILS NFR BLD AUTO: 41.9 % — LOW (ref 43–77)
PLATELET # BLD AUTO: 233 K/UL — SIGNIFICANT CHANGE UP (ref 150–400)
POTASSIUM SERPL-MCNC: 3.7 MMOL/L — SIGNIFICANT CHANGE UP (ref 3.5–5.3)
POTASSIUM SERPL-SCNC: 3.7 MMOL/L — SIGNIFICANT CHANGE UP (ref 3.5–5.3)
PROT SERPL-MCNC: 7.5 GM/DL — SIGNIFICANT CHANGE UP (ref 6–8.3)
PROTHROM AB SERPL-ACNC: 11.3 SEC — SIGNIFICANT CHANGE UP (ref 9.5–13)
RBC # BLD: 5.43 M/UL — SIGNIFICANT CHANGE UP (ref 4.2–5.8)
RBC # FLD: 12.2 % — SIGNIFICANT CHANGE UP (ref 10.3–14.5)
RSV RNA NPH QL NAA+NON-PROBE: SIGNIFICANT CHANGE UP
SARS-COV-2 RNA SPEC QL NAA+PROBE: SIGNIFICANT CHANGE UP
SODIUM SERPL-SCNC: 136 MMOL/L — SIGNIFICANT CHANGE UP (ref 135–145)
TROPONIN I, HIGH SENSITIVITY RESULT: 3.49 NG/L — SIGNIFICANT CHANGE UP
WBC # BLD: 8.32 K/UL — SIGNIFICANT CHANGE UP (ref 3.8–10.5)
WBC # FLD AUTO: 8.32 K/UL — SIGNIFICANT CHANGE UP (ref 3.8–10.5)

## 2024-02-02 PROCEDURE — 96374 THER/PROPH/DIAG INJ IV PUSH: CPT

## 2024-02-02 PROCEDURE — 99285 EMERGENCY DEPT VISIT HI MDM: CPT | Mod: 25

## 2024-02-02 PROCEDURE — 93005 ELECTROCARDIOGRAM TRACING: CPT

## 2024-02-02 PROCEDURE — 85379 FIBRIN DEGRADATION QUANT: CPT

## 2024-02-02 PROCEDURE — 85610 PROTHROMBIN TIME: CPT

## 2024-02-02 PROCEDURE — 80053 COMPREHEN METABOLIC PANEL: CPT

## 2024-02-02 PROCEDURE — 93010 ELECTROCARDIOGRAM REPORT: CPT

## 2024-02-02 PROCEDURE — 71045 X-RAY EXAM CHEST 1 VIEW: CPT

## 2024-02-02 PROCEDURE — 84484 ASSAY OF TROPONIN QUANT: CPT

## 2024-02-02 PROCEDURE — 36415 COLL VENOUS BLD VENIPUNCTURE: CPT

## 2024-02-02 PROCEDURE — 85025 COMPLETE CBC W/AUTO DIFF WBC: CPT

## 2024-02-02 PROCEDURE — 85730 THROMBOPLASTIN TIME PARTIAL: CPT

## 2024-02-02 PROCEDURE — 0241U: CPT

## 2024-02-02 PROCEDURE — 99285 EMERGENCY DEPT VISIT HI MDM: CPT

## 2024-02-02 PROCEDURE — 71045 X-RAY EXAM CHEST 1 VIEW: CPT | Mod: 26

## 2024-02-02 RX ORDER — KETOROLAC TROMETHAMINE 30 MG/ML
15 SYRINGE (ML) INJECTION ONCE
Refills: 0 | Status: DISCONTINUED | OUTPATIENT
Start: 2024-02-02 | End: 2024-02-02

## 2024-02-02 RX ADMIN — Medication 15 MILLIGRAM(S): at 19:29

## 2024-02-02 NOTE — ED STATDOCS - NSFOLLOWUPCLINICS_GEN_ALL_ED_FT
[facility]  Family Medicine  [address] [place] [address]  Phone: [phone]  Fax:

## 2024-02-02 NOTE — ED ADULT NURSE NOTE - OBJECTIVE STATEMENT
Pt c/o constant substernal chest pain x1 week with radiation to neck. No SOB, no cough, however notes subjective fever. No trauma no injury , no family or personal cardiac hx.

## 2024-02-02 NOTE — ED STATDOCS - PATIENT PORTAL LINK FT
You can access the FollowMyHealth Patient Portal offered by [facility] by registering at the following website: [email or web address]. By joining [employer]’s FollowMyHealth portal, you will also be able to view your health information using other applications (apps) compatible with our system.

## 2024-02-02 NOTE — ED STATDOCS - NSFOLLOWUPINSTRUCTIONS_ED_ALL_ED_FT
SIGUE A TU MÉDICO EN 1-2 DÍAS. REGRESE A LA ER PARA CUALQUIER SÍNTOMA PENDIENTE O NUEVAS PREOCUPACIONES.     Dolor de pecho inespecífico  Nonspecific Chest Pain  El dolor de pecho puede deberse a muchas afecciones diferentes. Algunas causas del dolor de pecho pueden ser potencialmente mortales. Estas requieren tratamiento inmediato. Algunas causas grave de dolor en el pecho son las siguientes:  Infarto de miocardio.Un desgarro en el vaso sanguíneo principal del cuerpo.Enrojecimiento e hinchazón (inflamación) alrededor del corazón.Un coágulo de katherine en los pulmones.Otras causas de dolor en el pecho pueden no ser tan graves. Estos incluyen los siguientes:  Acidez estomacal.Ansiedad o estrés.Daño en los huesos o músculos del corazón.Infecciones pulmonares.El dolor de pecho puede provocar las siguientes sensaciones:  Dolor o molestias en el pecho.Dolor opresivo, continuo o constrictivo. Ardor u hormigueo. Dolor sordo o intenso que empeora al moverse, toser o inhalar profundamente. Dolor o molestias que también se sienten en la espalda, el andrea, la mandíbula, el hombro o el brazo, o dolor que se extiende a cualquiera de estas zonas.Es difícil saber si la causa del dolor es algo grave o algo que no es tan grave. Por lo tanto, es importante que consulte al médico inmediatamente si tiene dolor en el pecho.  Siga estas indicaciones en farley casa:  Medicamentos     [place] los medicamentos de venta manav y los recetados solamente vinnie se lo haya indicado el médico.Si le recetaron un antibiótico, tómelo vinnie se lo haya indicado el médico. No deje de flako los antibióticos aunque comience a sentirse mejor.Estilo de krysten        Grisel reposo vinnie se lo haya indicado el médico.No consuma ningún producto que contenga nicotina o tabaco, vinnie cigarrillos, cigarrillos electrónicos y tabaco de mascar. Si necesita ayuda para dejar de fumar, consulte al médico.No ranjana alcohol.Grisel cambios en farley estilo de krysten según las indicaciones del médico. Estos pueden incluir lo siguiente:  Practicar actividad física con regularidad. Pregúntele al médico qué actividades son seguras para usted.Seguir bert dieta cardiosaludable. Un especialista en dietas y alimentación (nutricionista) puede ayudarlo a que grisel elecciones saludables.Mantener un peso saludable.Tratar la diabetes o la presión arterial michael, si es necesario.Reducir el estrés. Las actividades vinnie el yoga y las técnicas de relajación pueden ayudar.Indicaciones generales     Esté atento a cualquier cambio en los síntomas. Informe a farley médico si presenta algún cambio en los síntomas o si aparecen síntomas nuevos.Evite las actividades que le causen dolor de pecho.Concurra a todas las visitas de seguimiento vinnie se lo haya indicado el médico. [place] es importante. Es posible que tenga que someterse a más estudios si el dolor de pecho no desaparece.Comuníquese con un médico si:  El dolor de pecho no desaparece.Se siente deprimido.Tiene fiebre.Solicite ayuda inmediatamente si:  El dolor en el pecho es más intenso.Tiene tos que empeora o tose con katherine.Tiene dolor muy intenso en el vientre (abdomen).Pierde el conocimiento (se desmaya).Tiene cualquiera de estos síntomas sin ninguna causa nadine:  Malestar repentino en el pecho.Molestias repentinas en los brazos, la espalda, el andrea o la mandíbula.Le falta el aire en cualquier momento.Comienza a sudar de manera repentina o la piel se le humedece.Siente malestar estomacal (náuseas).Vomita.Se siente repentinamente mareado o se desmaya.Se siente muy débil o cansado.El corazón comienza a latirle rápidamente o parece que se saltea latidos.Estos síntomas pueden indicar bert emergencia. No espere a jamal si los síntomas desaparecen. Solicite atención médica de inmediato. Comuníquese con el servicio de emergencias de farley localidad (911 en los [place]). No conduzca por khushi propios medios hasta el hospital.   Resumen  El dolor de pecho puede deberse a muchas afecciones diferentes. La causa puede ser grave y requerir tratamiento de inmediato. Si tiene dolor de pecho, consulte al médico de inmediato.Siga las indicaciones del médico para flako los medicamentos y hacer cambios en farley estilo de krysten. Concurra a todas las visitas de seguimiento vinnie se lo haya indicado el médico. [place] incluye las visitas para realizarle otros estudios si el dolor de pecho no desaparece.Asegúrese de conocer los signos que indican que farley afección ha empeorado. Obtenga ayuda de inmediato si tiene esos síntomas.Esta información no tiene vinnie fin reemplazar el consejo del médico. Asegúrese de hacerle al médico cualquier pregunta que tenga.

## 2024-02-02 NOTE — ED STATDOCS - OBJECTIVE STATEMENT
44 y/o male p/w constant substernal chest pain x1 week with radiation to neck. No SOB, no cough, however notes subjective fever. No trauma no injury , no family or personal cardiac hx

## 2024-02-02 NOTE — ED STATDOCS - ATTENDING APP SHARED VISIT CONTRIBUTION OF CARE
I, Kavitha Camilo DO,  performed the initial face to face bedside interview with this patient regarding history of present illness, review of symptoms and relevant past medical, social and family history.  I completed an independent physical examination.  I was the initial provider who evaluated this patient.   I personally saw the patient and performed a substantive portion of the visit including all aspects of the medical decision making.  I have signed out the follow up of any pending tests (i.e. labs, radiological studies) to the ACP.  I have communicated the patient’s plan of care and disposition with the ACP.  The history, relevant review of systems, past medical and surgical history, medical decision making, and physical examination was documented by the scribe in my presence and I attest to the accuracy of the documentation.

## 2024-02-02 NOTE — ED ADULT NURSE NOTE - CAS DISCH TRANSFER METHOD
Detail Level: Detailed Quality 138: Melanoma: Coordination Of Care: A treatment plan was communicated to the physicians providing continuing care within one month of diagnosis outlining: diagnosis, tumor thickness and a plan for surgery or alternate care. Private car

## 2024-02-02 NOTE — ED STATDOCS - CLINICAL SUMMARY MEDICAL DECISION MAKING FREE TEXT BOX
44 y/o male p/w substernal chest pain will r/o ACS, screening labs, EKG, CXR, re-evaluate 44 y/o male p/w substernal chest pain will r/o ACS, screening labs, EKG, CXR, re-evaluate    signed Nicole Camargo PA-C Pt seen initially in intake by Dr Camilo. Pt declines  services.   43M with constant CP x 1 week. No fever, mild cough. Labs with slightly elevated LFTs. trop negative. No significant findings on EKG. cxr NAD. The xray images were personally reviewed individually by me. flu/covid/RSV negative. Pt feeling better after meds. recommend outpt f/u with [facility]. return precautions given. Pt feeling well, pt and family agree with DC and plan of care.

## 2024-02-07 ENCOUNTER — EMERGENCY (EMERGENCY)
Facility: HOSPITAL | Age: 44
LOS: 0 days | Discharge: HOME | End: 2024-02-07
Attending: STUDENT IN AN ORGANIZED HEALTH CARE EDUCATION/TRAINING PROGRAM
Payer: MEDICAID

## 2024-02-07 VITALS
OXYGEN SATURATION: 96 % | SYSTOLIC BLOOD PRESSURE: 123 MMHG | HEART RATE: 82 BPM | TEMPERATURE: 98 F | DIASTOLIC BLOOD PRESSURE: 79 MMHG | RESPIRATION RATE: 18 BRPM

## 2024-02-07 VITALS — HEIGHT: 68 IN | WEIGHT: 220.02 LBS

## 2024-02-07 DIAGNOSIS — M54.50 LOW BACK PAIN, UNSPECIFIED: ICD-10-CM

## 2024-02-07 PROCEDURE — 99283 EMERGENCY DEPT VISIT LOW MDM: CPT

## 2024-02-07 PROCEDURE — 99284 EMERGENCY DEPT VISIT MOD MDM: CPT

## 2024-02-07 RX ORDER — IBUPROFEN 200 MG
600 TABLET ORAL ONCE
Refills: 0 | Status: COMPLETED | OUTPATIENT
Start: 2024-02-07 | End: 2024-02-07

## 2024-02-07 RX ORDER — METHOCARBAMOL 500 MG/1
1 TABLET, FILM COATED ORAL
Qty: 12 | Refills: 0
Start: 2024-02-07 | End: 2024-02-09

## 2024-02-07 RX ORDER — ACETAMINOPHEN 500 MG
650 TABLET ORAL ONCE
Refills: 0 | Status: COMPLETED | OUTPATIENT
Start: 2024-02-07 | End: 2024-02-07

## 2024-02-07 RX ORDER — METHOCARBAMOL 500 MG/1
750 TABLET, FILM COATED ORAL ONCE
Refills: 0 | Status: COMPLETED | OUTPATIENT
Start: 2024-02-07 | End: 2024-02-07

## 2024-02-07 RX ADMIN — Medication 650 MILLIGRAM(S): at 11:56

## 2024-02-07 RX ADMIN — Medication 600 MILLIGRAM(S): at 11:54

## 2024-02-07 RX ADMIN — Medication 50 MILLIGRAM(S): at 11:55

## 2024-02-07 RX ADMIN — METHOCARBAMOL 750 MILLIGRAM(S): 500 TABLET, FILM COATED ORAL at 11:55

## 2024-02-07 NOTE — ED STATDOCS - PHYSICAL EXAMINATION
Constitutional: well, NAD AAOx3  Eyes: EOMI, PERRL  Head: Normocephalic atraumatic  Mouth: no airway obstruction  Cardiac: regular rate and rhythm  Resp: Lungs CTAB  GI: Abd s/nt/nd  Neuro: CN2-12 intact, no focal deficit  Skin: No rashes Constitutional: well, NAD AAOx3  Eyes: EOMI, PERRL  Head: Normocephalic atraumatic  Mouth: no airway obstruction  Cardiac: regular rate and rhythm  Resp: Lungs CTAB  GI: Abd s/nt/nd  MSK: No midline tenderness C, T, L spine. +b/l paraspinal lumbar tenderness.   Neuro: CN2-12 intact, no focal deficit. No saddle anesthesia. Strength 5/5 b/l LE.   Skin: No rashes

## 2024-02-07 NOTE — ED STATDOCS - OBJECTIVE STATEMENT
44 y/o male presents to the ED c/o lower back pain since yesterday. Pt reports lifting a generator yesterday. Denies weakness, numbness. hematuria. Pt took Tylenol at home with slight relief, last dose 9am. NKDA. No other complaints at this time. 44 y/o male presents to the ED c/o lower back pain since yesterday. Pt reports lifting a generator yesterday. Denies weakness, numbness. hematuria. Pt took Tylenol at home with slight relief, last dose 9pm yesterday. NKDA. No other complaints at this time.

## 2024-02-07 NOTE — ED ADULT NURSE NOTE - NSFALLUNIVINTERV_ED_ALL_ED
Bed/Stretcher in lowest position, wheels locked, appropriate side rails in place/Call bell, personal items and telephone in reach/Instruct patient to call for assistance before getting out of bed/chair/stretcher/Non-slip footwear applied when patient is off stretcher/Yeso to call system/Physically safe environment - no spills, clutter or unnecessary equipment/Purposeful proactive rounding/Room/bathroom lighting operational, light cord in reach

## 2024-02-07 NOTE — ED STATDOCS - PATIENT PORTAL LINK FT
You can access the FollowMyHealth Patient Portal offered by [facility] by registering at the following website: [email or web address]. By joining [employer]’s FollowMyHealth portal, you will also be able to view your health information using other applications (apps) compatible with our system.

## 2024-02-07 NOTE — ED STATDOCS - NSFOLLOWUPINSTRUCTIONS_ED_ALL_ED_FT
Follow up with the primary care provider  Please use 650mg tylenol (or acetaminophen) every 6 hours and 600mg motrin (or advil or ibuprofen) every 6 hours as needed for pain/discomfort/swelling.  Make sure not to use more than 3500mg in any 24 hour period.   Take the muscle relaxer as prescribed. Do not drink alcohol or drive on this medication   Any worsening of symptoms or new concerning symptoms return to the ED       Dolor de espalda jaye en los adultos  Acute Back Pain, Adult  El dolor de espalda jaye es repentino y por lo general no dura mucho tiempo. Se debe generalmente a bert lesión de los músculos y tejidos de la espalda. La lesión puede ser el resultado de:  Estiramiento en exceso o desgarro de un músculo, tendón o ligamento. Los ligamentos son tejidos que conectan los huesos. Levantar algo de forma incorrecta puede producir un esguince de espalda.  Desgaste (degeneración) de los discos vertebrales. Los discos vertebrales son tejidos circulares que proporcionan amortiguación entre los huesos de la columna vertebral (vértebras).  Movimientos de giro, vinnie al practicar deportes o realizar trabajos de jardinería.  Un golpe en la espalda.  Artritis.  Es posible que le realicen un examen físico, análisis de laboratorio u otros estudios de diagnóstico por imágenes para encontrar la causa del dolor. El dolor de espalda jaye generalmente desaparece con reposo y cuidados en la casa.    Siga estas instrucciones en [place] casa:  Control del dolor, la rigidez y la hinchazón    Use los medicamentos de venta manav y los recetados solamente vinnie se lo haya indicado el médico. El tratamiento puede incluir medicamentos para el dolor y la inflamación que se deisy por la boca o que se aplican sobre la piel, o relajantes musculares.  El médico puede recomendarle que se aplique hielo raghav las primeras 24 a 48 horas después del comienzo del dolor. Para hacer esto:  Ponga el hielo en bert bolsa plástica.  Coloque bert toalla entre la piel y la bolsa.  Aplique el hielo raghav 20 minutos, 2 o 3 veces por día.  Retire el hielo si la piel se pone de color winkler brillante. Refugio es muy importante. Si no puede sentir dolor, calor o frío, tiene un mayor riesgo de que se dañe la christina.  Si se lo indican, aplique calor en la christina afectada con la frecuencia que le haya indicado el médico. Use la zayra de calor que el médico le recomiende, vinnie bert compresa de calor húmedo o bert almohadilla térmica.  Coloque bert toalla entre la piel y la zayra de calor.  Aplique calor raghav 20 a 30 minutos.  Retire la zayra de calor si la piel se pone de color winkler brillante. Refugio es especialmente importante si no puede sentir dolor, calor o frío. Corre un mayor riesgo de sufrir quemaduras.  Actividad    Comparisons of good and bad posture while driving, standing, sitting at a desk, and lifting heavy objects.  No permanezca en la cama. Hacer reposo en la cama por más de 1 a 2 días puede demorar farley recuperación.  Mantenga bert buena postura al sentarse y pararse. No se incline hacia adelante al sentarse ni se encorve al pararse.  Si trabaja en un escritorio, siéntese cerca de erma para no tener que inclinarse. Mantenga el mentón hacia abajo. Mantenga el andrea hacia atrás y los codos flexionados en un ángulo de 90 grados (ángulo recto).  Cuando conduzca, siéntese elevado y cerca del volante. Agregue un apoyo para la espalda (lumbar) al asiento del automóvil, si es necesario.  Realice caminatas cortas en superficies manny tan pronto vinnie le sea posible. Trate de caminar un poco más de tiempo cada día.  No se siente, conduzca o permanezca de pie en un mismo lugar raghav más de 30 minutos seguidos. Pararse o sentarse raghav largos períodos de tiempo puede sobrecargar la espalda.  No conduzca ni use maquinaria pesada mientras cinthia analgésicos recetados.  Use técnicas apropiadas para levantar objetos. Cuando se inclina y levanta un objeto, utilice posiciones que no sobrecarguen tanto la espalda:  Flexione las rodillas.  Mantenga la carga cerca del cuerpo.  No se tuerza.  Shana actividad física habitualmente vinnie se lo haya indicado el médico. Hacer ejercicios ayuda a que la espalda sane más rápido y ayuda a evitar las lesiones de la espalda al mantener los músculos giorgio y flexibles.  Trabaje con un fisioterapeuta para crear un programa de ejercicios seguros, según lo recomiende el médico. Shana ejercicios vinnie se lo haya indicado el fisioterapeuta.  Estilo de krysten    Mantenga un peso saludable. El sobrepeso sobrecarga la espalda y hace que resulte difícil tener bert buena postura.  Evite actividades o situaciones que lo maddison sentirse ansioso o estresado. El estrés y la ansiedad aumentan la tensión muscular y pueden empeorar el dolor de espalda. Aprenda formas de manejar la ansiedad y el estrés, vinnie a través del ejercicio.  Instrucciones generales    Duerma sobre un colchón firme en bert posición cómoda. Intente acostarse de costado, con las rodillas ligeramente flexionadas. Si se recuesta sobre la espalda, coloque bert almohada debajo de las rodillas.  Mantenga la elin y el andrea en línea recta con la columna vertebral (posición neutra) cuando use equipos electrónicos vinnie teléfonos inteligentes o tablets. Para hacer esto:  Levante el teléfono inteligente o la tablet para mirarlo en lugar de inclinar la elin o el andrea para mirar hacia abajo.  Coloque el teléfono inteligente o la tablet al nivel de farley rito mientras wilfredo la pantalla.  Siga el plan de tratamiento vinnie se lo haya indicado el médico. Refugio puede incluir:  Terapia cognitiva o conductual.  Acupuntura o terapia de masajes.  Yoga o meditación.  Comuníquese con un médico si:  Siente un dolor que no se [place] con reposo o medicamentos.  Siente mucho dolor que se extiende a las piernas o las nalgas.  El dolor no mejora luego de 2 semanas.  Siente dolor por la noche.  Pierde peso sin proponérselo.  Tiene fiebre o escalofríos.  Siente náuseas o vómitos.  Siente dolor abdominal.  Solicite ayuda de inmediato si:  Tiene nuevos problemas para controlar la vejiga o los intestinos.  Siente debilidad o adormecimiento inusuales en los brazos o en las piernas.  Siente que va a desmayarse.  Estos síntomas pueden representar un problema grave que constituye bert emergencia. No espere a jamal si los síntomas desaparecen. Solicite atención médica de inmediato. Comuníquese con el servicio de emergencias de far[place] localidad (911 en los [place]). No conduzca por khushi propios medios hasta el hospital.    Resumen  El dolor de espalda jaye es repentino y por lo general no dura mucho tiempo.  Use técnicas apropiadas para levantar objetos. Cuando se inclina y levanta un objeto, utilice posiciones que no sobrecarguen tanto la espalda.  [place] los medicamentos de venta manav y los recetados solamente vinnie se lo haya indicado el médico, y aplíquese calor o hielo según las indicaciones.  Esta información no tiene vinnie fin reemplazar el consejo del médico. Asegúrese de hacerle al médico cualquier pregunta que tenga.

## 2024-02-07 NOTE — ED STATDOCS - PROGRESS NOTE DETAILS
[place] PAC: Pt here for acute back pain after lifting. no red flags/neurological findings. will give meds and dc home with meds. Discussed with patient need to return to ED if symptoms don't continue to improve or recur or develops any new or worsening and symptoms that are of concern.

## 2024-02-07 NOTE — ED ADULT TRIAGE NOTE - CHIEF COMPLAINT QUOTE
pt c/o lower back pain since yesterday. Endorses heavy lifting, has been taking tylenol and has a lidocaine patch on w/ slight relief. Pt A&ox4, ambulatory. no s/s of distress. - allergies

## 2024-02-07 NOTE — ED ADULT NURSE NOTE - NS_NURSE_DISC_TEACHING_YN_ED_ALL_ED
13yo presents with dizziness this morning which has since then resolved, Two weeks ago had a head injury which resulted in him feeling dizzy. At the time no LOC
Yes

## 2024-02-07 NOTE — ED STATDOCS - ATTENDING APP SHARED VISIT CONTRIBUTION OF CARE
I, Ping Koch DO,  performed the initial face to face bedside interview with this patient regarding history of present illness, review of symptoms and relevant past medical, social and family history.  I completed an independent physical examination.  I was the initial provider who evaluated this patient.   I personally saw the patient and performed a substantive portion of the visit including all aspects of the medical decision making.  I have signed out the follow up of any pending tests (i.e. labs, radiological studies) to the [facility].  I have communicated the patient’s plan of care and disposition with the [facility].  The history, relevant review of systems, past medical and surgical history, medical decision making, and physical examination was documented by the scribe in my presence and I attest to the accuracy of the documentation.

## 2024-03-09 ENCOUNTER — APPOINTMENT (OUTPATIENT)
Dept: RADIOLOGY | Facility: CLINIC | Age: 44
End: 2024-03-09
Payer: SELF-PAY

## 2024-03-09 ENCOUNTER — OUTPATIENT (OUTPATIENT)
Dept: OUTPATIENT SERVICES | Facility: HOSPITAL | Age: 44
LOS: 1 days | End: 2024-03-09
Payer: COMMERCIAL

## 2024-03-09 DIAGNOSIS — G89.29 OTHER CHRONIC PAIN: ICD-10-CM

## 2024-03-09 PROCEDURE — 72074 X-RAY EXAM THORAC SPINE4/>VW: CPT | Mod: 26

## 2024-03-09 PROCEDURE — 72070 X-RAY EXAM THORAC SPINE 2VWS: CPT

## 2024-03-09 PROCEDURE — 72074 X-RAY EXAM THORAC SPINE4/>VW: CPT
